# Patient Record
(demographics unavailable — no encounter records)

---

## 2017-05-15 NOTE — ULTRASOUND REPORT
PELVIC ULTRASOUND: 05/15/2017

 

CLINICAL INDICATION: Left lower quadrant pain, irregular menses, ovarian cancer. 

 

COMPARISON: 10/07/2015. 

 

TECHNIQUE: Transabdominal pelvic ultrasound performed for global evaluation. Transvaginal pelvic ultr
asound performed for detailed evaluation. Real-time scanning performed and static images obtained.

 

FINDINGS:  The uterus is anteverted, measuring 7.7 x 4.7 x 3.5 cm. The endometrial echo complex measu
res 3 mm. No focal myometrial lesion is present. The right ovary is surgically absent. The left ovary
 measures 3.6 x 2.1 x 1.2 cm. Free fluid is seen adjacent to the left ovary. 

 

IMPRESSION:  FREE FLUID, LIKELY REPRESENTING A RECENTLY RUPTURED CYST. NORMAL APPEARANCE OF THE LEFT 
OVARY AND UTERUS. CHANGES OF RIGHT OOPHORECTOMY.

 

 

 

DD:05/15/2017 12:07:58  DT: 05/15/2017 12:32  JOB #: [id number]  EXT JOB #:[id number]

## 2017-05-15 NOTE — ED PHYSICIAN DOCUMENTATION
History of Present Illness





- Stated complaint


Stated Complaint: LEFT SIDE PX





- Chief complaint


Chief Complaint: Abd Pain





- Additonal information


Additional information: 





hx from pt


29 female


s/p ovarian cancer and right oopherectomy


abrupt onset LLQ pain last night


still hurts today


rad to flank


chills no fever


some urinary sx - cannot empty


vag bleeding for a month, was heavy now mild / spotting


doubts preg


some diarrhea


no blood in urine or marya








Review of Systems


Constitutional: reports: Chills.  denies: Fever


Cardiac: denies: Chest pain / pressure, Palpitations


Respiratory: denies: Dyspnea, Cough


GI: reports: Abdominal Pain, Diarrhea.  denies: Bloody / black stool


: reports: Hesitancy, Vaginal bleeding.  denies: Now pregnant EGA (doubts)


Musculoskeletal: reports: Back pain


Endocrine: denies: Easy bruising / bleeding


Immunocompromised: denies: Immunocompromised





PD PAST MEDICAL HISTORY





- Past Medical History


Cardiovascular: None


Endocrine/Autoimmune: None


GI: None


GYN: Ovarian cysts


: None


HEENT: Chronic hearing loss


Psych: Depression, Anxiety


Musculoskeletal: Other


Derm: None





- Past Surgical History


Past Surgical History: Yes


General: Appendectomy


/GYN:  section, Oophrectomy





- Present Medications


Home Medications: 


 Ambulatory Orders











 Medication  Instructions  Recorded  Confirmed


 


HYDROcod/ACETAM 5/325 [Norco 5/325] 1 ea PO Q6H PRN #5 tablet 05/15/17 


 


Ibuprofen [Motrin] 400 mg PO Q6H PRN #30 tablet 05/15/17 














- Allergies


Allergies/Adverse Reactions: 


 Allergies











Allergy/AdvReac Type Severity Reaction Status Date / Time


 


adhesive Allergy  Rash Verified 05/15/17 10:02


 


Penicillins Allergy  Hives Verified 05/15/17 10:02


 


vancomycin Allergy  Rash Verified 05/15/17 10:02


 


latex AdvReac  Rash Verified 05/15/17 10:02














- Social History


Does the pt smoke?: Yes


Smoking Status: Current every day smoker


Does the pt drink ETOH?: No


Does the pt have substance abuse?: No





- Immunizations


Immunizations are current?: Yes





- POLST


Patient has POLST: No





PD ED PE NORMAL





- Vitals


Vital signs reviewed: Yes





- Cardiac


Cardiac: RRR





- Respiratory


Respiratory: No respiratory distress





- Abdomen


Abdomen: Soft, Other (TTP LLQ, mild guarding, no rebound, surgical low midline 

scar, minimal RLQ pain)





- Back


Back: No CVA TTP





- Derm


Derm: Normal color





- Neuro


Neuro: Alert and oriented X 3





Results





- Vitals


Vitals: 


 Vital Signs - 24 hr











  05/15/17 05/15/17





  09:58 12:18


 


Temperature 36.6 C 


 


Heart Rate 65 72


 


Respiratory 14 12





Rate  


 


Blood Pressure 128/88 H 111/69


 


O2 Saturation 100 100








 Oxygen











O2 Source                      Room air

















- Labs


Labs: 


 Laboratory Tests











  05/15/17





  10:15


 


Urine Color  YELLOW


 


Urine Clarity  CLEAR


 


Urine pH  6.5


 


Ur Specific Gravity  1.010


 


Urine Protein  NEGATIVE


 


Urine Glucose (UA)  NEGATIVE


 


Urine Ketones  NEGATIVE


 


Urine Occult Blood  NEGATIVE


 


Urine Nitrite  NEGATIVE


 


Urine Bilirubin  NEGATIVE


 


Urine Urobilinogen  0.2 (NORMAL)


 


Ur Leukocyte Esterase  NEGATIVE


 


Ur Microscopic Review  NOT INDICATED


 


Urine Culture Comments  NOT INDICATED


 


Urine HCG, Qual  NEGATIVE














- Rads (name of study)


  ** pelvic sono


Radiology: See rad report (FF next to L ovary otherwise nl)





Departure





- Departure


Disposition: 01 Home, Self Care


Clinical Impression: 


 Ruptured ovarian cyst


Condition: Good


Instructions:  ED Cyst Ovarian


Follow-Up: 


Jeffrey Mercer MD [Primary Care Provider] - 


Prescriptions: 


Ibuprofen [Motrin] 400 mg PO Q6H PRN #30 tablet


 PRN Reason: Pain


HYDROcod/ACETAM 5/325 [Norco 5/325] 1 ea PO Q6H PRN #5 tablet


 PRN Reason: Severe Pain


Comments: 


The urine test was fine


You are not pregnant


The ultrasound shows fluid around the left ovary consistent with a ruptured cyst


Otherwise the ovary looks fine and has normal blood flow


The pain should gradually subside as the fluid reabsorbs over the next few days 

- you should not need vicodin for more than one day.


Follow up with your GYN this week


Return to the ER sooner if worse or new symptoms develop - occasionally fluid 

or blood continues to leak and surgery is needed
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
## 2017-12-15 NOTE — ED PHYSICIAN DOCUMENTATION
PD HPI FEMALE 





- Stated complaint


Stated Complaint: FEMALE 





- Chief complaint


Chief Complaint: Abd Pain





- History obtained from


History obtained from: Patient





- History of Present Illness


Timing - onset: Yesterday


Timing - duration: Days (1)


Timing - details: Gradual onset, Still present


Associated symptoms: Pelvic pain, Vaginal bleeding


OB-GYN History: Oopeherctomy, Ovarian cancer


Similar symptoms before: Has not had sx before


Recently seen: Not recently seen





- Additional information


Additional information: 





29-year-old female who is a survivor of ovarian cancer has had her right ovary 

removed and she has been in remission since .  She has had a heavy heavy 

menstrual period 3 weeks ago she had one week with no bleeding and then 

yesterday morning began to bleed heavily again.  She went through 20 pads and a 

24 hour period. She has had some cramping pelvic pain as well similar to what 

she has had with heavy periods previously.  She does not have a known history 

of fibroids.  She does get regular follow-up imaging done.She was at work this 

morning bleeding heavily and her boss was concerned about the possibility of 

pregnancy and miscarriage and sent her to the hospital for evaluation.





Review of Systems


Constitutional: denies: Fever, Myalgias


Eyes: denies: Decreased vision


Ears: denies: Ear pain


Nose: denies: Congestion


Throat: denies: Sore throat


Cardiac: denies: Chest pain / pressure


Respiratory: denies: Dyspnea, Cough


GI: denies: Abdominal Pain, Nausea, Vomiting


: reports: Vaginal bleeding, Irregular menses.  denies: Dysuria, Frequency


Skin: denies: Rash


Musculoskeletal: reports: Back pain.  denies: Neck pain, Extremity pain





PD PAST MEDICAL HISTORY





- Past Medical History


Cardiovascular: None


Respiratory: None


Neuro: None


Endocrine/Autoimmune: None


GI: None


GYN: Ovarian cysts


: None


HEENT: Chronic hearing loss


Psych: Depression, Anxiety


Musculoskeletal: Other


Derm: None





- Past Surgical History


Past Surgical History: Yes


General: Appendectomy


/GYN:  section, Oophrectomy





- Present Medications


Home Medications: 


 Ambulatory Orders











 Medication  Instructions  Recorded  Confirmed


 


No Known Home Medications [No  12/15/17 12/15/17





Known Home Medications]   














- Allergies


Allergies/Adverse Reactions: 


 Allergies











Allergy/AdvReac Type Severity Reaction Status Date / Time


 


adhesive Allergy  Rash Verified 17 21:47


 


Penicillins Allergy  Hives Verified 17 21:47


 


vancomycin Allergy  Rash Verified 17 21:47


 


latex AdvReac  Rash Verified 17 21:47














- Social History


Does the pt smoke?: Yes


Smoking Status: Current every day smoker


Does the pt drink ETOH?: No


Does the pt have substance abuse?: No





- Immunizations


Immunizations are current?: Yes





- POLST


Patient has POLST: No





PD ED PE NORMAL





- Vitals


Vital signs reviewed: Yes (Tachycardic)





- General


General: Alert and oriented X 3, Well developed/nourished, Other (The patient 

appears emotional on arrival.)





- HEENT


HEENT: Atraumatic, PERRL, EOMI





- Neck


Neck: Supple, no meningeal sign, No bony TTP





- Cardiac


Cardiac: RRR, No murmur





- Respiratory


Respiratory: No respiratory distress, Clear bilaterally





- Abdomen


Abdomen: Soft, Other (Mild suprapubic tenderness without guarding or rebound)





- Back


Back: No CVA TTP, No spinal TTP





- Derm


Derm: Normal color, Warm and dry, No rash





- Extremities


Extremities: No deformity, No edema





- Neuro


Neuro: No motor deficit, No sensory deficit


Eye Opening: Spontaneous


Motor: Obeys Commands


Verbal: Oriented


GCS Score: 15





- Psych


Psych: Normal mood, Normal affect





Results





- Vitals


Vitals: 


 Vital Signs - 24 hr











  12/15/17





  09:29


 


Temperature 36.5 C


 


Heart Rate 106 H


 


Respiratory 16





Rate 


 


Blood Pressure 123/79


 


O2 Saturation 100








 Oxygen











O2 Source                      Room air

















- Labs


Labs: 


 Laboratory Tests











  12/15/17 12/15/17





  09:32 09:32


 


Urine Color   YELLOW


 


Urine Clarity   CLEAR


 


Urine pH   5.5


 


Ur Specific Gravity  >=1.030 H  >=1.030 H


 


Urine Protein   NEGATIVE


 


Urine Glucose (UA)   NEGATIVE


 


Urine Ketones   NEGATIVE


 


Urine Occult Blood   TRACE-INTA


 


Urine Nitrite   NEGATIVE


 


Urine Bilirubin   NEGATIVE


 


Urine Urobilinogen   0.2 (NORMAL)


 


Ur Leukocyte Esterase   NEGATIVE


 


Ur Microscopic Review   NOT INDICATED


 


Urine Culture Comments   NOT INDICATED


 


Urine HCG, Qual  NEGATIVE 














PD MEDICAL DECISION MAKING





- ED course


Complexity details: reviewed old records, reviewed results, re-evaluated patient

, considered differential, d/w patient


ED course: 





29-year-old female with dysfunctional uterine bleeding has a regular follow-up 

with GYN with Dr. Agudelo in [place].  I have asked her to call Dr. Dailey 

consider starting birth control today to lessen the bleeding and follow-up with 

him.  She is not pregnant today.





Departure





- Departure


Disposition: 01 Home, Self Care


Clinical Impression: 


 Dysfunctional uterine bleeding





Condition: Good


Instructions:  ED Bleed Irregular Vaginal


Follow-Up: 


Jeffrey Mercer MD [Primary Care Provider] - 


Comments: 


Follow-up with Dr. Cuba this afternoon by telephone and asked about starting some 

birth control for decreasing the bleeding.


Forms:  Activity restrictions

## 2018-03-31 NOTE — ED PHYSICIAN DOCUMENTATION
PD HPI BACK PAIN





- Stated complaint


Stated Complaint: TAILBONE PAIN





- Chief complaint


Chief Complaint: Ext Problem





- History obtained from


History obtained from: Patient





- History of Present Illness


Timing - onset: Today


Timing - details: Abrupt onset


Pain level now: 8


Location: Lower, Right, Left


Quality: Pain


Associated symptoms: No: Fever, Weakness, Numbness, Incontinent of urine, 

Unable to urinate, Hematuria, Incontinent of stool


Improves with: Rest


Worsened by: Movement


Similar symptoms before: Work up / diagnostics ( ED performed xrays last week)


Recently seen: Emergency Dept





- Additional information


Additional information: 





15th [facility] ED visit in past 3 years. c/o low back pain, describes recurring 

injury to low back due to falls over past few weeks and again tonight. seen in 

 ED last week for LBP after falling. she says US was performed and they 

recommended CT, but she says she could not arrange for CT because her PMD is a 

pediatrician (per patient). she subsequently clarifies that she had xrays and 

not US 





Review of Systems


: denies: Unable to Void, Incontinent


Musculoskeletal: reports: Back pain.  denies: Neck pain


Neurologic: denies: Focal weakness, Numbness





PD PAST MEDICAL HISTORY





- Past Medical History


Cardiovascular: None


Respiratory: None


Neuro: None


Endocrine/Autoimmune: None


GI: None


GYN: Ovarian cysts, Ovarian cancer


: None


HEENT: Chronic hearing loss


Psych: Depression, Anxiety


Musculoskeletal: Other


Derm: None


Other Past Medical History: pelvic adhesions





- Past Surgical History


Past Surgical History: Yes


General: Appendectomy


/GYN:  section, Oophrectomy





- Present Medications


Home Medications: 


 Ambulatory Orders











 Medication  Instructions  Recorded  Confirmed


 


HYDROcod/ACETAM 5/325 [Norco 5/325] 1 - 2 ea PO Q6H PRN #15 tablet 18 














- Allergies


Allergies/Adverse Reactions: 


 Allergies











Allergy/AdvReac Type Severity Reaction Status Date / Time


 


adhesive Allergy  Rash Verified 18 03:23


 


Penicillins Allergy  Hives Verified 18 03:23


 


vancomycin Allergy  Rash Verified 18 03:23


 


latex AdvReac  Rash Verified 18 03:23














- Social History


Does the pt smoke?: Yes


Smoking Status: Current every day smoker


Does the pt drink ETOH?: Yes


Does the pt have substance abuse?: No





- Immunizations


Immunizations are current?: Yes





- POLST


Patient has POLST: No





PD ED PE NORMAL





- Vitals


Vital signs reviewed: Yes





- General


General: Alert and oriented X 3, No acute distress, Well developed/nourished





- HEENT


HEENT: Atraumatic





- Back


Back: No CVA TTP, Other (mild bilateral paralumbar tenderness without bruising, 

swelling, or bony step-off)





- Extremities


Extremities: Normal ROM s pain





- Neuro


Neuro: Alert and oriented X 3, No motor deficit, No sensory deficit





Results





- Vitals


Vitals: 


 Oxygen











O2 Source                      Room air

















PD MEDICAL DECISION MAKING





- ED course


Complexity details: reviewed old records (reviewed records from  ED via [facility])

, considered differential, d/w patient





Departure





- Departure


Disposition:  Home, Self Care


Clinical Impression: 


Injury of back


Qualifiers:


 Encounter type: initial encounter Qualified Code(s): S39.92XA - Unspecified 

injury of lower back, initial encounter





Condition: Good


Instructions:  ED Low Back Pain Injury


Follow-Up: 


[facility] [Provider Group]


[facility] [Provider Group]


Jeffrey Mercer MD [Primary Care Provider] - 


Prescriptions: 


HYDROcod/ACETAM 5/325 [Norco 5/325] 1 - 2 ea PO Q6H PRN #15 tablet


 PRN Reason: Pain


Forms:  Activity restrictions


Discharge Date/Time: 18 08:08

## 2018-05-19 NOTE — ED PHYSICIAN DOCUMENTATION
PD HPI BACK PAIN





- Stated complaint


Stated Complaint: BACK PX





- Chief complaint


Chief Complaint: Back Pain





- History obtained from


History obtained from: Patient





- History of Present Illness


Timing - onset: How many days ago (5)


Timing - details: Gradual onset


Pain level max: 9


Pain level now: 8


Location: Lower, Right, Left


Quality: Pain, Spasm, Similar to prior episodes


Associated symptoms: No: Fever, Weakness, Numbness, Incontinent of urine, 

Unable to urinate, Hematuria, Incontinent of stool


Improves with: Rest


Worsened by: Movement


Contributing factors: Other (States started after gardening).  No: 

Anticoagulated, Cancer, IVDA


Similar symptoms before: Diagnosis (Has had chronic low back pain)


Recently seen: Not recently seen





Review of Systems


Constitutional: denies: Fever, Chills


Ears: denies: Ear pain


Nose: denies: Rhinorrhea / runny nose, Congestion


Cardiac: denies: Chest pain / pressure


Respiratory: denies: Cough


GI: denies: Abdominal Pain, Nausea, Vomiting, Diarrhea


: denies: Frequency, Hesitancy, Hematuria


Skin: denies: Rash


Musculoskeletal: denies: Neck pain


Neurologic: denies: Focal weakness, Numbness





PD PAST MEDICAL HISTORY





- Past Medical History


Past Medical History: Yes


Cardiovascular: None


Respiratory: None


Endocrine/Autoimmune: None


GI: None


GYN: Ovarian cysts, Ovarian cancer


: None


HEENT: Chronic hearing loss


Psych: Depression, Anxiety


Musculoskeletal: Other


Derm: None





- Past Surgical History


Past Surgical History: Yes


General: Appendectomy


/GYN:  section, Oophrectomy





- Present Medications


Home Medications: 


 Ambulatory Orders











 Medication  Instructions  Recorded  Confirmed


 


HYDROcod/ACETAM 5/325 [Norco 5/325] 1 - 2 ea PO Q6H PRN #15 tablet 18 


 


Cyclobenzaprine [Flexeril] 10 mg PO TID PRN #20 tablet 18 


 


Hydrocodone/Acetaminophen 1 - 2 each PO Q6H PRN #14 tablet 18 





[Hydrocodon-Acetaminophen 5-325]   














- Allergies


Allergies/Adverse Reactions: 


 Allergies











Allergy/AdvReac Type Severity Reaction Status Date / Time


 


adhesive Allergy  Rash Verified 18 10:23


 


Penicillins Allergy  Hives Verified 18 10:23


 


vancomycin Allergy  Rash Verified 18 10:23


 


latex AdvReac  Rash Verified 18 10:23














- Social History


Does the pt smoke?: Yes


Smoking Status: Current every day smoker


Does the pt drink ETOH?: Yes


Does the pt have substance abuse?: No





- Immunizations


Immunizations are current?: Yes





- POLST


Patient has POLST: No





PD ED PE NORMAL





- Vitals


Vital signs reviewed: Yes





- General


General: Alert and oriented X 3, Other (appears uncomfortable)





- HEENT


HEENT: Moist mucous membranes





- Neck


Neck: Supple, no meningeal sign





- Cardiac


Cardiac: RRR





- Respiratory


Respiratory: No respiratory distress, Clear bilaterally





- Abdomen


Abdomen: Soft, Non tender, Non distended





- Back


Back: No spinal TTP (no midline tenderness to palpation or percussion), Other (

Paraspinal spasm present bilateral low lumbar)





- Derm


Derm: Warm and dry





- Extremities


Extremities: Other (normal bilateral lower extremity patellar and ankle jerk 

reflexes. Normal great toe extension bilaterally)





- Neuro


Neuro: Alert and oriented X 3, No motor deficit, No sensory deficit





- Psych


Psych: Normal mood, Normal affect





Results





- Vitals


Vitals: 


 Vital Signs - 24 hr











  18





  10:21 11:39


 


Temperature 36.2 C L 36.8 C


 


Heart Rate 88 62


 


Respiratory 16 16





Rate  


 


Blood Pressure 124/93 H 103/68


 


O2 Saturation 100 99








 Oxygen











O2 Source                      Room air

















- Labs


Labs: 


 Laboratory Tests











  18





  10:23


 


Urine Color  YELLOW


 


Urine Clarity  CLEAR


 


Urine pH  7.0


 


Ur Specific Gravity  1.020


 


Urine Protein  NEGATIVE


 


Urine Glucose (UA)  NEGATIVE


 


Urine Ketones  NEGATIVE


 


Urine Occult Blood  NEGATIVE


 


Urine Nitrite  NEGATIVE


 


Urine Bilirubin  NEGATIVE


 


Urine Urobilinogen  0.2 (NORMAL)


 


Ur Leukocyte Esterase  NEGATIVE


 


Ur Microscopic Review  NOT INDICATED


 


Urine Culture Comments  NOT INDICATED


 


Urine HCG, Qual  NEGATIVE














PD MEDICAL DECISION MAKING





- ED course


Complexity details: re-evaluated patient, considered differential (no cauda 

equina, no spinal epidural abscess, no fracture, no aortic dissection or 

evidence of aneursym rupture), d/w patient


ED course: 





Patient is a 30-year-old female who presents to the emergency department with 

her normal exacerbation of her chronic back pain.  States that the only thing 

that normally helps as hydrocodone or Percocet.  Will trial her on hydrocodone 

for home as well as Flexeril.  She feels better after medication here.  No 

evidence of cauda equina, epidural abscess, fracture.  Normal neurological exam 

and gait.  Patient counseled regarding signs and symptoms for which I believe 

and urgent re-evaluation would be necessary. Patient with good understanding of 

and agreement to plan and is comfortable going home at this time





This document was made in part using voice recognition software. While efforts 

are made to proofread this document, sound alike and grammatical errors may 

occur.





Departure





- Departure


Disposition: 01 Home, Self Care


Clinical Impression: 


Back pain


Qualifiers:


 Back pain location: low back pain Chronicity: acute Back pain laterality: 

bilateral Sciatica presence: without sciatica Qualified Code(s): M54.5 - Low 

back pain





Back strain


Qualifiers:


 Encounter type: initial encounter Qualified Code(s): S39.012A - Strain of 

muscle, fascia and tendon of lower back, initial encounter





Condition: Poor


Instructions:  ED Spasm Back No Trauma


Follow-Up: 


Jeffrey Mercer MD [Primary Care Provider] - Within 1 week


Prescriptions: 


Cyclobenzaprine [Flexeril] 10 mg PO TID PRN #20 tablet


 PRN Reason: Spasms


Hydrocodone/Acetaminophen [Hydrocodon-Acetaminophen 5-325] 1 - 2 each PO Q6H 

PRN #14 tablet


 PRN Reason: pain


Comments: 


Return if you worsen. This should improve over the next few days.





Do not drink alcohol or drive while on narcotic pain medicine. 


Note that many narcotic pain relievers also contain tylenol/acetaminophen. 

Please ensure that your total dose of acetaminophen from all sources does not 

exceed 3 grams (3000mg) per day. 


You may constipated on this medication, take a stool softener such as "Colace" 

twice a day while you are on it. Also recommend a over-the-counter laxative 

such as senna or MiraLAX any day that you do not have a bowel movement. 


If you received narcotic pain medication in the emergency department, do not 

drive or operate machinery for the next 24 hours.





Discharge Date/Time: 18 11:43

## 2018-06-16 NOTE — ED PHYSICIAN DOCUMENTATION
PD HPI NVD





- Stated complaint


Stated Complaint: N/V





- Chief complaint


Chief Complaint: Abd Pain





- History obtained from


History obtained from: Patient





- History of Present Illness


Timing - onset: How many hours ago (3)


Timing - duration: Hours (3)


Timing - details: Abrupt onset


Pain level now: 3


Associated symptoms: Abdominal pain.  No: Fever


Improved by: Meds (improved with IV zofran en route given by medics)


Worsened by: Other


Similar symptoms before: No diagnosis


Recently seen: Clinic, Emergency Dept





- Additonal information


Additional information: 





Patient presents via ambulance for three hours of nausea and vomiting with 

abdominal discomfort. She says she has nausea every morning when she wakes up 

which typically resolves after she vomits once, but this morning it persisted 

and that she called 911. this is her fourth emergency department visit in the 

past four weeks which includes visit to [facility] as well as [facility] 

emergency department. She was evaluated in the outpatient setting at [facility] earlier this week to further evaluate a left pelvic mass, had an 

ultrasound, and is waiting to hear back from the gynecologist regarding follow 

up.





Review of Systems


Constitutional: reports: Reviewed and negative


Cardiac: reports: Reviewed and negative


Respiratory: reports: Reviewed and negative


GI: reports: Abdominal Pain, Nausea, Vomiting





PD PAST MEDICAL HISTORY





- Past Medical History


Past Medical History: Yes


Cardiovascular: None


Respiratory: None


Endocrine/Autoimmune: None


GI: None


GYN: Ovarian cysts, Ovarian cancer


: None


HEENT: Chronic hearing loss


Psych: Depression, Anxiety


Musculoskeletal: Other


Derm: None





- Past Surgical History


Past Surgical History: Yes


General: Appendectomy


/GYN:  section, Oophrectomy





- Present Medications


Home Medications: 


 Ambulatory Orders











 Medication  Instructions  Recorded  Confirmed


 


Citalopram [CeleXA] 10 mg PO DAILY 18


 


Ondansetron Odt [Zofran] 4 mg TL Q6H PRN #20 tablet 18 














- Allergies


Allergies/Adverse Reactions: 


 Allergies











Allergy/AdvReac Type Severity Reaction Status Date / Time


 


adhesive Allergy  Rash Verified 18 05:02


 


Penicillins Allergy  Hives Verified 18 05:02


 


vancomycin Allergy  Rash Verified 18 05:02


 


latex AdvReac  Rash Verified 18 05:02














- Social History


Does the pt smoke?: Yes


Smoking Status: Current every day smoker


Does the pt drink ETOH?: Yes


Does the pt have substance abuse?: No


Substance Use and Type: Marijuana





- Immunizations


Immunizations are current?: Yes





- POLST


Patient has POLST: No





PD ED PE NORMAL





- Vitals


Vital signs reviewed: Yes





- General


General: Alert and oriented X 3, No acute distress, Well developed/nourished





- HEENT


HEENT: Moist mucous membranes





- Cardiac


Cardiac: RRR, No murmur





- Respiratory


Respiratory: No respiratory distress, Clear bilaterally





- Abdomen


Abdomen: Soft, Non tender, Non distended





- Back


Back: No CVA TTP





- Derm


Derm: Normal color, Warm and dry





Results





- Vitals


Vitals: 


 Vital Signs - 24 hr











  18





  04:58 06:11 06:43


 


Temperature 36.1 C L  36.7 C


 


Heart Rate 66 55 L 58 L


 


Respiratory 18 18 18





Rate   


 


Blood Pressure 117/75 116/57 L 104/49 L


 


O2 Saturation 100 99 99








 Oxygen











O2 Source                      Room air

















PD MEDICAL DECISION MAKING





- ED course


Complexity details: reviewed old records, reviewed results, re-evaluated patient

, considered differential, d/w patient


ED course: 





results of this weeks ultrasound performed at [facility] were faxed and 

reviewed by me. Recent ED records also reviewed including visit to [facility] ED as well as the previous [facility] ED visit. blood work performed at 

the beginning of this month at [facility] ED reviewed and results were 

unremarkable. Her symptoms this morning were improved with Zofran on rude and 

resolved shortly after arriving to this emergency department, and thus no blood 

work was performed. She was given 1 L of normal saline intravenously, and on 

revaluation reported her symptoms have not returned.





- Sepsis Event


Vital Signs: 


 Vital Signs - 24 hr











  18





  04:58 06:11 06:43


 


Temperature 36.1 C L  36.7 C


 


Heart Rate 66 55 L 58 L


 


Respiratory 18 18 18





Rate   


 


Blood Pressure 117/75 116/57 L 104/49 L


 


O2 Saturation 100 99 99








 Oxygen











O2 Source                      Room air

















Departure





- Departure


Disposition:  Home, Self Care


Clinical Impression: 


 Vomiting





Condition: Good


Instructions:  ED Nausea Vomiting


Prescriptions: 


Ondansetron Odt [Zofran] 4 mg TL Q6H PRN #20 tablet


 PRN Reason: Nausea / Vomiting


Comments: 


Follow up with your doctor; call Monday to arrange for next available 

appointment


Discharge Date/Time: 18 06:44

## 2018-06-30 NOTE — ED PHYSICIAN DOCUMENTATION
PD HPI ABD PAIN





- Stated complaint


Stated Complaint: PX/BLEEDING





- Chief complaint


Chief Complaint: Abd Pain





- History obtained from


History obtained from: Patient





- History of Present Illness


Timing - onset: How many years ago (3), Chronic


Location: Suprapubic


Recently seen: Emergency Dept





- Additional information


Additional information: 





Patient is a 30 year old female with a 3 year history of lower abdominal pain.  

patient was being worked up in the emergency department but was not receiving 

iv narcotics so she took out her IV and refused imaging.  patient checked in 

about 30 minutes later and stated that she would stay of imaging.  





Review of Systems


Ten Systems: 10 systems reviewed and negative


GI: reports: Abdominal Pain, Nausea.  denies: Vomiting





PD PAST MEDICAL HISTORY





- Past Medical History


Past Medical History: Yes


Cardiovascular: None


Respiratory: None


Endocrine/Autoimmune: None


GI: None


GYN: Ovarian cysts, Ovarian cancer


: None


HEENT: Chronic hearing loss


Psych: Depression, Anxiety


Musculoskeletal: Other


Derm: None





- Past Surgical History


Past Surgical History: Yes


General: Appendectomy


/GYN:  section, Oophrectomy





- Present Medications


Home Medications: 


 Ambulatory Orders











 Medication  Instructions  Recorded  Confirmed


 


Citalopram [CeleXA] 10 mg PO DAILY 18


 


Ondansetron Odt [Zofran] 4 mg TL Q6H PRN #20 tablet 18 


 


Doxycycline Hyclate 100 mg PO BID #28 capsule 18 














- Allergies


Allergies/Adverse Reactions: 


 Allergies











Allergy/AdvReac Type Severity Reaction Status Date / Time


 


adhesive Allergy  Rash Verified 18 22:44


 


Penicillins Allergy  Hives Verified 18 22:44


 


vancomycin Allergy  Rash Verified 18 22:44


 


latex AdvReac  Rash Verified 18 22:44














- Social History


Does the pt smoke?: Yes


Smoking Status: Current every day smoker


Does the pt drink ETOH?: Yes


Does the pt have substance abuse?: No





- Immunizations


Immunizations are current?: Yes





- POLST


Patient has POLST: No





PD ED PE NORMAL





- Vitals


Vital signs reviewed: Yes





- General


General: Alert and oriented X 3





- HEENT


HEENT: Atraumatic





- Cardiac


Cardiac: RRR





- Respiratory


Respiratory: No respiratory distress





- Abdomen


Abdomen: Soft





- Derm


Derm: Normal color, Warm and dry





- Extremities


Extremities: No deformity





- Neuro


Neuro: Alert and oriented X 3





PD ED PE EXPANDED





- Abdomen


Abdomen: Tender to palpation, LLQ.  No: Rebound, Guarding





Results





- Vitals


Vitals: 


 Vital Signs - 24 hr











  18





  22:37


 


Temperature 36.9 C


 


Heart Rate 101 H


 


Respiratory 16





Rate 


 


Blood Pressure 132/91 H


 


O2 Saturation 100








 Oxygen











O2 Source                      Room air

















- Rads (name of study)


  ** ct abd and pelvis


Radiology: Final report received, See rad report (multiple findings see 

radiology report)





PD MEDICAL DECISION MAKING





- ED course


Complexity details: reviewed old records, reviewed results, re-evaluated patient

, considered differential, d/w patient


ED course: 





Patient was seen and examined at bedside.  IV access was gained and imaging was 

re-ordered.  when patient returned from imaging the results were reviewed. 

Patient had multiple pelvic findings.  the findings were reviewed with the 

patient as well as the necessary follow up care.  Patient was started on 

doxycycline for any possible infectious component.  copies of the images and 

labs were made for the patient.  Patient was made aware that motrin and tylenol 

were the indicated medications and patient took out her IV and walked out.  





- Sepsis Event


Vital Signs: 


 Vital Signs - 24 hr











  18





  22:37


 


Temperature 36.9 C


 


Heart Rate 101 H


 


Respiratory 16





Rate 


 


Blood Pressure 132/91 H


 


O2 Saturation 100








 Oxygen











O2 Source                      Room air

















Departure





- Departure


Disposition: 01 Home, Self Care


Clinical Impression: 


 Hydrosalpinx





Condition: Good


Instructions:  Cysts Ovarian


Follow-Up: 


primary,care provider [Other]


Prescriptions: 


Doxycycline Hyclate 100 mg PO BID #28 capsule


Comments: 


You had multiple findings on your imaging today.  You are being started on 

antibiotics as sometimes infection can be playing a role.  Ultimately you will 

need to follow up with your ob/gyn physician for more definitive care.  A copy 

of your imaging has been provided for you.  You can take motrin or tylenol as 

needed for pain.  You may return to the emergency department at any time for new

, worsening or uncontrollable symptoms.

## 2018-06-30 NOTE — CT REPORT
Procedure Date:  06/30/2018   

Accession Number:  [id number] / [id number]                    

Procedure:  CT  - Abdomen/Pelvis W/ CPT Code:  

 

FULL RESULT:

 

 

EXAM:

CT ABDOMEN AND PELVIS

 

EXAM DATE: 6/30/2018 11:12 PM.

 

CLINICAL HISTORY: Left lower quadrant pain

 

COMPARISONS: ABDOMEN/PELVIS W/ 10/05/2015.

 

TECHNIQUE: Routine helical CT imaging was performed through the abdomen 

and pelvis. IV contrast: ISOVUE 300  100mL. Enteric contrast: No. 

Reconstructions: Coronal and sagittal.

 

In accordance with CT protocol optimization, one or more of the following 

dose reduction techniques were utilized for this exam: automated exposure 

control, adjustment of mA and/or KV based on patient size, or use of 

iterative reconstructive technique.

 

FINDINGS:

Lung Bases: Unremarkable.

 

Liver: There is hypodensity likely representative of fatty sparing within 

the anterior aspect of the left hepatic lobe.

 

Gallbladder/Bile Ducts: Unremarkable.

 

Spleen: Normal.

 

Pancreas: Normal.

 

Adrenal Glands: Normal.

 

Kidneys: Normal. No masses or hydronephrosis.

 

Peritoneal Cavity/Bowel: No dilated or thick-walled bowel is seen. There 

is moderate stool within colon. No enlarged mesenteric or retroperitoneal 

lymph nodes. No intraperitoneal free air or free fluid.

 

Pelvic Organs: There is a low-density focus measuring approximately 15 x 

5 cm within the left adnexa.

 

Vasculature: No aneurysms or other significant abnormality.

 

Bones: No significant abnormality.

 

Other: None.

IMPRESSION:

1. Circumscribed fluid density focus within the right adnexa is 

suspicious for hydrosalpinx. Differential consideration would be an 

enlarged ovarian cyst or cystic ovarian neoplasm.

2. No acute gastrointestinal tract abnormalities are seen.

3. Solid abdominal organs demonstrate no acute abnormalities.

 

[facility]

## 2018-06-30 NOTE — ED PHYSICIAN DOCUMENTATION
PD HPI ABD PAIN





- Stated complaint


Stated Complaint: ABDOMINAL PX/FAINTING





- Chief complaint


Chief Complaint: Abd Pain





- History obtained from


History obtained from: Patient





- History of Present Illness


Timing - onset: How many years ago (3), Chronic


Timing - details: Intermittant, Waxing and waning


Quality: Cramping, Aching


Location: LLQ


Associated symptoms: Nausea, Vomiting.  No: Fever


Similar symptoms before: Work up / diagnostics, Treatment


Recently seen: Clinic





- Additional information


Additional information: 





Patient is a 30 year old female presenting to the emergency department for a 

three year history of intermittent left lower quadrant abdominal pain.  Patient 

states that nobody has been able to diagnose her.  Patient sates that she had 

an episode of fainting today and the pain seemed worse so she came to the 

emergency department for evaluation.  





Review of Systems


Constitutional: denies: Fever, Chills


Eyes: reports: Reviewed and negative


Ears: reports: Reviewed and negative


Nose: reports: Reviewed and negative


Cardiac: denies: Chest pain / pressure


Respiratory: denies: Dyspnea


GI: reports: Abdominal Pain.  denies: Abdominal Swelling, Nausea, Vomiting, 

Constipation, Diarrhea


: denies: Dysuria, Frequency


Musculoskeletal: denies: Back pain


Neurologic: reports: Syncope.  denies: Headache, Head injury





PD PAST MEDICAL HISTORY





- Past Medical History


Cardiovascular: None


Respiratory: None


Endocrine/Autoimmune: None


GI: None


GYN: Ovarian cysts, Ovarian cancer


: None


HEENT: Chronic hearing loss


Psych: Depression, Anxiety


Musculoskeletal: Other


Derm: None





- Past Surgical History


Past Surgical History: Yes


General: Appendectomy


/GYN:  section, Oophrectomy





- Present Medications


Home Medications: 


 Ambulatory Orders











 Medication  Instructions  Recorded  Confirmed


 


Citalopram [CeleXA] 10 mg PO DAILY 18


 


Ondansetron Odt [Zofran] 4 mg TL Q6H PRN #20 tablet 18 














- Allergies


Allergies/Adverse Reactions: 


 Allergies











Allergy/AdvReac Type Severity Reaction Status Date / Time


 


adhesive Allergy  Rash Verified 18 22:44


 


Penicillins Allergy  Hives Verified 18 22:44


 


vancomycin Allergy  Rash Verified 18 22:44


 


latex AdvReac  Rash Verified 18 22:44














- Social History


Does the pt smoke?: Yes


Smoking Status: Current every day smoker


Does the pt drink ETOH?: Yes


Does the pt have substance abuse?: No





- Immunizations


Immunizations are current?: Yes





- POLST


Patient has POLST: No





PD ED PE NORMAL





- Vitals


Vital signs reviewed: Yes





- General


General: Alert and oriented X 3, No acute distress





- HEENT


HEENT: Atraumatic





- Cardiac


Cardiac: RRR





- Respiratory


Respiratory: No respiratory distress





- Abdomen


Abdomen: Soft





- Derm


Derm: Normal color, Warm and dry, No rash





- Extremities


Extremities: No deformity





- Neuro


Neuro: Alert and oriented X 3, No motor deficit


Eye Opening: Spontaneous





PD ED PE EXPANDED





- HEENT


HEENT: Dental decay





- Abdomen


Abdomen: Tender to palpation, LLQ.  No: Distended, Rebound, Guarding





Results





- Vitals


Vitals: 


 Vital Signs - 24 hr











  18





  20:04 21:40


 


Temperature 36.3 C L 


 


Heart Rate 101 H 70


 


Respiratory 16 





Rate  


 


Blood Pressure 120/79 101/74


 


O2 Saturation 100 100








 Oxygen











O2 Source                      Room air

















- Labs


Labs: 


 Laboratory Tests











  18





  20:21 20:21 20:59


 


WBC  4.9  


 


RBC  4.45  


 


Hgb  14.2  


 


Hct  42.6  


 


MCV  95.7  


 


MCH  31.8 H  


 


MCHC  33.2  


 


RDW  15.5 H  


 


Plt Count  279  


 


MPV  7.5 L  


 


Neut # (Auto)  2.2  


 


Lymph # (Auto)  2.0  


 


Mono # (Auto)  0.6  


 


Eos # (Auto)  0.1  


 


Baso # (Auto)  0.0  


 


Absolute Nucleated RBC  0.00  


 


Nucleated RBC %  0.1  


 


Sodium   138 


 


Potassium   3.9 


 


Chloride   102 


 


Carbon Dioxide   30 


 


Anion Gap   6.0 


 


BUN   10 


 


Creatinine   0.8 


 


Estimated GFR (MDRD)   84 L 


 


Glucose   75 


 


Calcium   9.3 


 


Total Bilirubin   0.5 


 


AST   20 


 


ALT   19 


 


Alkaline Phosphatase   52 


 


Total Protein   7.0 


 


Albumin   3.8 


 


Globulin   3.2 


 


Albumin/Globulin Ratio   1.2 


 


Lipase   21 L 


 


Urine Color    YELLOW


 


Urine Clarity    CLEAR


 


Urine pH    7.5


 


Ur Specific Gravity    <=1.005


 


Urine Protein    NEGATIVE


 


Urine Glucose (UA)    NEGATIVE


 


Urine Ketones    NEGATIVE


 


Urine Occult Blood    NEGATIVE


 


Urine Nitrite    NEGATIVE


 


Urine Bilirubin    NEGATIVE


 


Urine Urobilinogen    0.2 (NORMAL)


 


Ur Leukocyte Esterase    NEGATIVE


 


Urine RBC    None Seen


 


Urine WBC    0-3


 


Ur Squamous Epith Cells    MANY Squamous H


 


Urine Bacteria    None Seen


 


Urine Culture Comments    NOT INDICATED


 


Urine HCG, Qual    NEGATIVE














PD MEDICAL DECISION MAKING





- ED course


Complexity details: reviewed old records, reviewed results, re-evaluated patient

, considered differential, d/w patient


ED course: 





Patient was seen and examined at bedside.  patient was well appearing and in no 

distress.  labs were drawn and urine was collected.  patient was treated with 

toradol for pain.  Imaging was ordered.  patient stated that she wanted to 

leave before imaging was performed.  It was extremely unlikely that there was 

any emergent condition that needed to be treated.  patient was discharged in 

stable condition but walked out, tearing our her iv, before receiving her 

paperwork.  





- Sepsis Event


Vital Signs: 


 Vital Signs - 24 hr











  18





  20:04 21:40


 


Temperature 36.3 C L 


 


Heart Rate 101 H 70


 


Respiratory 16 





Rate  


 


Blood Pressure 120/79 101/74


 


O2 Saturation 100 100








 Oxygen











O2 Source                      Room air

















Departure





- Departure


Disposition: 01 Home, Self Care


Clinical Impression: 


 Abdominal pain





Condition: Good


Instructions:  Abdominal Pain


Discharge Date/Time: 18 22:21

## 2018-07-14 NOTE — ED PHYSICIAN DOCUMENTATION
PD HPI FEMALE 





- Stated complaint


Stated Complaint: FEMALE 





- Chief complaint


Chief Complaint: Abd Pain





- History obtained from


History obtained from: Patient





- History of Present Illness


Timing - onset: How many days ago (2)


Timing - details: Gradual onset, Still present


Associated symptoms: Hematuria


Similar symptoms before: Work up / diagnostics


Recently seen: Not recently seen





- Additional information


Additional information: 





patient is a 30 year old female with multiple  issues including a large mass 

that is scheduled to be removed.  Patient reports that she had hematuria so she 

called her doctor who told her to come to the emergency department to be seen.  

patient is well appearing and denies any other complaints at this time.  





Review of Systems


Ten Systems: 10 systems reviewed and negative


: reports: Hematuria, Vaginal bleeding





PD PAST MEDICAL HISTORY





- Past Medical History


Past Medical History: Yes


Cardiovascular: None


Respiratory: None


Endocrine/Autoimmune: None


GI: None


GYN: Ovarian cysts, Ovarian cancer


: None


HEENT: Chronic hearing loss


Psych: Depression, Anxiety


Musculoskeletal: Other


Derm: None





- Past Surgical History


Past Surgical History: Yes


General: Appendectomy


/GYN:  section, Oophrectomy





- Present Medications


Home Medications: 


 Ambulatory Orders











 Medication  Instructions  Recorded  Confirmed


 


Ondansetron Odt [Zofran] 4 mg TL Q6H PRN #20 tablet 18


 


Nitrofurantoin Monohyd/M-Cryst 100 mg PO BID 5 Days  capsule 18 





[Macrobid 100 mg Capsule]   














- Allergies


Allergies/Adverse Reactions: 


 Allergies











Allergy/AdvReac Type Severity Reaction Status Date / Time


 


adhesive Allergy  Rash Verified 18 22:56


 


Penicillins Allergy  Hives Verified 18 22:56


 


vancomycin Allergy  Rash Verified 18 22:56


 


latex AdvReac  Rash Verified 18 22:56














- Social History


Does the pt smoke?: Yes


Smoking Status: Current every day smoker


Does the pt drink ETOH?: Yes


Does the pt have substance abuse?: No





- Immunizations


Immunizations are current?: Yes





- POLST


Patient has POLST: No





PD ED PE NORMAL





- Vitals


Vital signs reviewed: Yes





- General


General: Alert and oriented X 3, No acute distress





- HEENT


HEENT: Atraumatic





- Cardiac


Cardiac: RRR





- Respiratory


Respiratory: No respiratory distress





- Abdomen


Abdomen: Soft, Non tender, Non distended





- Derm


Derm: Normal color, Warm and dry





- Extremities


Extremities: No deformity





- Neuro


Neuro: Alert and oriented X 3


Eye Opening: Spontaneous


Motor: Obeys Commands


Verbal: Oriented


GCS Score: 15





Results





- Vitals


Vitals: 


 Vital Signs - 24 hr











  18





  22:53 23:40


 


Temperature 36.6 C 


 


Heart Rate 102 H 71


 


Respiratory 96 H 20





Rate  


 


Blood Pressure 112/84 H 106/69


 


O2 Saturation  97








 Oxygen











O2 Source                      Room air

















- Labs


Labs: 


 Laboratory Tests











  18





  23:00


 


Urine Color  YELLOW


 


Urine Clarity  CLEAR


 


Urine pH  6.0


 


Ur Specific Gravity  <=1.005


 


Urine Protein  NEGATIVE


 


Urine Glucose (UA)  NEGATIVE


 


Urine Ketones  NEGATIVE


 


Urine Occult Blood  NEGATIVE


 


Urine Nitrite  NEGATIVE


 


Urine Bilirubin  NEGATIVE


 


Urine Urobilinogen  0.2 (NORMAL)


 


Ur Leukocyte Esterase  TRACE H


 


Urine RBC  None Seen


 


Urine WBC  4-5


 


Ur Squamous Epith Cells  RARE Squamous


 


Urine Bacteria  None Seen


 


Ur Microscopic Review  INDICATED


 


Urine Culture Comments  INDICATED


 


Urine HCG, Qual  NEGATIVE














PD MEDICAL DECISION MAKING





- ED course


Complexity details: reviewed old records, reviewed results, re-evaluated patient

, considered differential, d/w patient


ED course: 





Patient was seen and examined at bedside.  patient was well appearing and in no 

distress.  Patient's urine was collected and did have small amount of leuk 

esterase.  Patient's previous urinalysis and cultures were reviewed.  the 

presentation and findings were similar to patient's previous infection in the 

past so patient was started on macrobid.  Patient required no further work up 

at this time and was stable for discharge with outpatient follow up.  





- Sepsis Event


Vital Signs: 


 Vital Signs - 24 hr











  18





  22:53 23:40


 


Temperature 36.6 C 


 


Heart Rate 102 H 71


 


Respiratory 96 H 20





Rate  


 


Blood Pressure 112/84 H 106/69


 


O2 Saturation  97








 Oxygen











O2 Source                      Room air

















Departure





- Departure


Disposition:  Home, Self Care


Clinical Impression: 


 Urinary tract infection





Condition: Good


Instructions:  ED UTI Cystitis Female


Follow-Up: 


Jeffrey Mercer MD [Primary Care Provider] - As Needed


Prescriptions: 


Nitrofurantoin Monohyd/M-Cryst [Macrobid 100 mg Capsule] 100 mg PO BID 5 Days  

capsule


Comments: 


Your urinalysis was similar to 2016 when you had a urinary tract infection.  

You are being started on antibiotics tonight and will be on them for the next 5 

days.  it is important that you stay well hydrated.  You should continue to 

follow up with your doctor for further care.  You may return to the emergency 

department at any time for new, worsening or uncontrollable symptoms.    


Discharge Date/Time: 18 23:42

## 2018-07-29 NOTE — XRAY REPORT
Procedure Date:  07/29/2018   

Accession Number:  [id number] / [id number]                    

Procedure:  XR  - Shoulder 3 View RT CPT Code:  

 

FULL RESULT:

 

 

EXAM:

RIGHT SHOULDER RADIOGRAPHY

 

EXAM DATE: 7/29/2018 01:45 PM.

 

CLINICAL HISTORY: Right shoulder pain.

 

COMPARISON: None.

 

TECHNIQUE: 3 views.

 

FINDINGS:

Bones: Normal. No fracture or bone lesion.

 

Joints: The glenohumeral and acromioclavicular joints are normal.

 

Soft tissues: The visualized hemithorax is unremarkable. No soft tissue 

swelling.

IMPRESSION: Normal shoulder radiography.

 

[facility]

## 2018-07-29 NOTE — ED PHYSICIAN DOCUMENTATION
PD HPI UPPER EXT INJURY





- Stated complaint


Stated Complaint: SHOULDER PX





- Chief complaint


Chief Complaint: Ext Problem





- History obtained from


History obtained from: Patient





- History of Present Illness


Location: Right, Shoulder


Type of injury: Other (Without specific injury she has had a burning pain of 

the entirety of the posterior and top of the right shoulder up to the neck for 

the last 3 days.  Hurts to move the shoulder at all and hurts to look to her 

left.  She describes numbness in that arm as well as weakness.  She has taken 

over-the-counter medications for pain without effect.  She has not had problems 

with that shoulder before.)





Review of Systems


Constitutional: denies: Fever, Chills


Cardiac: denies: Chest pain / pressure, Palpitations


Respiratory: denies: Dyspnea, Cough


: denies: Now pregnant EGA





PD PAST MEDICAL HISTORY





- Past Medical History


Past Medical History: Yes


Cardiovascular: None


Respiratory: None


Endocrine/Autoimmune: None


GI: None


GYN: Ovarian cysts, Ovarian cancer


: None


HEENT: Chronic hearing loss


Psych: Depression, Anxiety


Musculoskeletal: Other


Derm: None





- Past Surgical History


Past Surgical History: Yes


General: Appendectomy


/GYN:  section, Oophrectomy





- Present Medications


Home Medications: 


 Ambulatory Orders











 Medication  Instructions  Recorded  Confirmed


 


Citalopram Hydrobromide  18





[Citalopram HBr]   


 


Cyclobenzaprine [Flexeril] 10 mg PO TID PRN #20 tablet 18 


 


Oxycodone HCl/Acetaminophen 1 - 2 tab PO Q4H PRN #15 tablet 18 





[Percocet 5-325 mg Tablet]   














- Allergies


Allergies/Adverse Reactions: 


 Allergies











Allergy/AdvReac Type Severity Reaction Status Date / Time


 


adhesive Allergy  Rash Verified 18 22:56


 


Penicillins Allergy  Hives Verified 18 22:56


 


vancomycin Allergy  Rash Verified 18 22:56


 


latex AdvReac  Rash Verified 18 13:12














- Social History


Does the pt smoke?: Yes


Smoking Status: Current every day smoker


Does the pt drink ETOH?: Yes


Does the pt have substance abuse?: No





- Immunizations


Immunizations are current?: Yes





- POLST


Patient has POLST: No





PD ED PE NORMAL





- Vitals


Vital signs reviewed: Yes





- General


General: Alert and oriented X 3, No acute distress





- Neck


Neck: Supple, no meningeal sign, No bony TTP





- Extremities


Extremities: Other (She has diffuse muscular tenderness of the right shoulder 

posteriorly and laterally.  She is able to abduct to about 30 and has 

relatively painless internal and external rotation.  She has numbness 

throughout the right arm which is completely in a nondermatomal pattern and 

involves the deltoid, both sides of the biceps, both sides of the forearm, and 

both sides of the hand.  She also has partial weakness throughout that arm 

which is also in a inconsistent/nondermatomal pattern with weak  strength, 

weak thumb extension, weak interosseous strength etc.  She has normal radial 

pulses.)





- Neuro


Neuro: Alert and oriented X 3, Normal speech





Results





- Vitals


Vitals: 


 Vital Signs - 24 hr











  18





  13:09 14:10


 


Temperature 36.4 C L 36.6 C


 


Heart Rate 118 H 74


 


Respiratory 18 16





Rate  


 


Blood Pressure 114/83 H 107/71


 


O2 Saturation 100 98








 Oxygen











O2 Source                      Room air

















- Rads (name of study)


  ** R shoulder 3v


Radiology: EMP read contemporaneously (normal)





PD MEDICAL DECISION MAKING





- ED course


ED course: 





Cervical radiculopathy is considered given the neck pain and weakness, however 

her weakness and numbness are completely in a nondermatomal distribution which 

suggests against radiculopathy and more towards pain related weakness.





- Sepsis Event


Vital Signs: 


 Vital Signs - 24 hr











  18





  13:09 14:10


 


Temperature 36.4 C L 36.6 C


 


Heart Rate 118 H 74


 


Respiratory 18 16





Rate  


 


Blood Pressure 114/83 H 107/71


 


O2 Saturation 100 98








 Oxygen











O2 Source                      Room air

















Departure





- Departure


Disposition: 01 Home, Self Care


Clinical Impression: 


Right shoulder pain


Qualifiers:


 Chronicity: acute Qualified Code(s): M25.511 - Pain in right shoulder





Condition: Good


Record reviewed to determine appropriate education?: Yes


Instructions:  ED Strain Muscle Ext


Follow-Up: 


[facility] [Provider Group]


Prescriptions: 


Cyclobenzaprine [Flexeril] 10 mg PO TID PRN #20 tablet


 PRN Reason: Pain


Oxycodone HCl/Acetaminophen [Percocet 5-325 mg Tablet] 1 - 2 tab PO Q4H PRN #15 

tablet


 PRN Reason: Pain


Comments: 


Call your doctor to arrange a follow-up appointment, make the next available 

appointment.  In the interim, return anytime if worse or if new symptoms 

develop.





Your blood pressure was elevated today on check into the emergency department.  

This does not mean that you have hypertension, it is a common phenomenon to 

come to the emergency department and have elevated blood pressure.  I recommend 

that you see your primary care physician within the week to have it rechecked 

when you are feeling better.


Discharge Date/Time: 18 14:10

## 2018-08-10 NOTE — ED PHYSICIAN DOCUMENTATION
PD HPI ABD PAIN





- Stated complaint


Stated Complaint: N/V, 





- Chief complaint


Chief Complaint: Abd Pain





- History obtained from


History obtained from: Patient





- History of Present Illness


Timing - onset: Today (30-year-old woman has had a lot of proximal pelvic 

problems in the past and she has been bleeding for about a week.  But today she 

started having vomiting and diarrhea in the.  There is really no increase in 

her abdominal pain except for some cramping and fullness.  She denies 

hematemesis.  She does not think she has hematochezia because it is hard to 

tell because of the vaginal bleeding.  No sick contacts or recent travel.)





Review of Systems


Constitutional: reports: Chills.  denies: Fever


Respiratory: denies: Dyspnea, Cough


GI: reports: Abdominal Pain, Nausea, Vomiting, Diarrhea





PD PAST MEDICAL HISTORY





- Past Medical History


Cardiovascular: None


Respiratory: None


Endocrine/Autoimmune: None


GI: None


GYN: Ovarian cysts, Ovarian cancer


: None


HEENT: Chronic hearing loss


Psych: Depression, Anxiety


Musculoskeletal: Other


Derm: None





- Past Surgical History


Past Surgical History: Yes


General: Appendectomy


/GYN:  section, Oophrectomy





- Present Medications


Home Medications: 


 Ambulatory Orders











 Medication  Instructions  Recorded  Confirmed


 


Citalopram Hydrobromide  18





[Citalopram HBr]   


 


Cyclobenzaprine [Flexeril] 10 mg PO TID PRN #20 tablet 18 


 


Oxycodone HCl/Acetaminophen 1 - 2 tab PO Q4H PRN #15 tablet 18 





[Percocet 5-325 mg Tablet]   


 


Loperamide [Imodium] 2 mg PO QID PRN #10 capsule 08/10/18 


 


Ondansetron HCl [Zofran] 4 mg PO Q6H PRN #10 tablet 08/10/18 


 


Promethazine [Phenergan] 25 - 50 mg PO Q6H PRN #15 tab 08/10/18 














- Allergies


Allergies/Adverse Reactions: 


 Allergies











Allergy/AdvReac Type Severity Reaction Status Date / Time


 


adhesive Allergy  Rash Verified 18 22:56


 


Penicillins Allergy  Hives Verified 18 22:56


 


vancomycin Allergy  Rash Verified 18 22:56


 


latex AdvReac  Rash Verified 18 13:12














- Social History


Does the pt smoke?: Yes


Smoking Status: Current every day smoker


Does the pt drink ETOH?: Yes


Does the pt have substance abuse?: No





- Immunizations


Immunizations are current?: Yes





- POLST


Patient has POLST: No





PD ED PE NORMAL





- Vitals


Vital signs reviewed: Yes





- General


General: Alert and oriented X 3, No acute distress





- HEENT


HEENT: PERRL, EOMI





- Neck


Neck: Supple, no meningeal sign, No bony TTP





- Cardiac


Cardiac: RRR, No murmur





- Respiratory


Respiratory: No respiratory distress, Clear bilaterally





- Abdomen


Abdomen: Normal bowel sounds, Soft, Non tender





- Neuro


Neuro: Alert and oriented X 3, Normal speech





Results





- Vitals


Vitals: 


 Vital Signs - 24 hr











  08/10/18





  16:45


 


Temperature 36.8 C


 


Heart Rate 70


 


Respiratory 20





Rate 


 


Blood Pressure 131/100 H


 


O2 Saturation 100








 Oxygen











O2 Source                      Room air

















- Labs


Labs: 


 Laboratory Tests











  08/10/18 08/10/18





  17:01 17:01


 


WBC  7.3 


 


RBC  3.99 L 


 


Hgb  13.0 


 


Hct  37.9 


 


MCV  94.9 


 


MCH  32.5 H 


 


MCHC  34.3 


 


RDW  14.3 


 


Plt Count  233 


 


MPV  7.7 L 


 


Neut # (Auto)  5.9 


 


Lymph # (Auto)  1.1 L 


 


Mono # (Auto)  0.3 


 


Eos # (Auto)  0.0 


 


Baso # (Auto)  0.0 


 


Absolute Nucleated RBC  0.00 


 


Nucleated RBC %  0.0 


 


Sodium   140


 


Potassium   3.5


 


Chloride   107


 


Carbon Dioxide   26


 


Anion Gap   7.0


 


BUN   8


 


Creatinine   0.8


 


Estimated GFR (MDRD)   84 L


 


Glucose   102 H


 


Calcium   8.7


 


Total Bilirubin   0.8


 


AST   14


 


ALT   11


 


Alkaline Phosphatase   53


 


Total Protein   6.7


 


Albumin   3.7


 


Globulin   3.0


 


Albumin/Globulin Ratio   1.2


 


Lipase   19 L














PD MEDICAL DECISION MAKING





- ED course


ED course: 





She has chronic pelvic complaints, now that is compounded by what sounds like 

gastroenteritis with a benign exam.  On recheck at 6:25 PM after the 

administration of Toradol, Reglan, Imodium and Levsin she was feeling much 

better but still having some stomach pain but exam remained benign.  The nausea 

was much better.





- Sepsis Event


Vital Signs: 


 Vital Signs - 24 hr











  08/10/18





  16:45


 


Temperature 36.8 C


 


Heart Rate 70


 


Respiratory 20





Rate 


 


Blood Pressure 131/100 H


 


O2 Saturation 100








 Oxygen











O2 Source                      Room air

















Departure





- Departure


Disposition: 01 Home, Self Care


Clinical Impression: 


 Gastroenteritis





Condition: Good


Record reviewed to determine appropriate education?: Yes


Instructions:  ED Gastroenteritis Viral


Prescriptions: 


Loperamide [Imodium] 2 mg PO QID PRN #10 capsule


 PRN Reason: Diarrhea


Ondansetron HCl [Zofran] 4 mg PO Q6H PRN #10 tablet


 PRN Reason: Nausea / Vomiting


Promethazine [Phenergan] 25 - 50 mg PO Q6H PRN #15 tab


 PRN Reason: Nausea / Vomiting


Comments: 


If not better in 12 hours please return for reevaluation, sooner if worse.





Your blood pressure was elevated today on check into the emergency department.  

This does not mean that you have hypertension, it is a common phenomenon to 

come to the emergency department and have elevated blood pressure.  I recommend 

that you see your primary care physician within the week to have it rechecked 

when you are feeling better.

## 2018-08-14 NOTE — ED PHYSICIAN DOCUMENTATION
History of Present Illness





- Stated complaint


Stated Complaint: FEELING FAINT





- Chief complaint


Chief Complaint: General





- History obtained from


History obtained from: Patient





- History of Present Illness


Timing: Today (30-year-old with chronic pelvic pain, seen by me about 4 days 

ago for what sounds like a viral syndrome with vomiting.  That has resolved and 

she is back to her routine pelvic pain, bleeding which is not heavy, and 

nausea.  Today though she feels quite lightheaded on standing up and her vision 

whites out.  She has not syncopized.  She denies chest pain or trouble 

breathing.)





Review of Systems


Constitutional: reports: Fatigue.  denies: Fever, Chills


Cardiac: denies: Chest pain / pressure, Palpitations


Respiratory: denies: Dyspnea, Cough





PD PAST MEDICAL HISTORY





- Past Medical History


Cardiovascular: None


Respiratory: None


Endocrine/Autoimmune: None


GI: None


GYN: Ovarian cysts, Ovarian cancer


: None


HEENT: Chronic hearing loss


Psych: Depression, Anxiety


Musculoskeletal: Other


Derm: None





- Past Surgical History


Past Surgical History: Yes


General: Appendectomy


/GYN:  section, Oophrectomy





- Present Medications


Home Medications: 


 Ambulatory Orders











 Medication  Instructions  Recorded  Confirmed


 


Citalopram Hydrobromide  18





[Citalopram HBr]   


 


Cyclobenzaprine [Flexeril] 10 mg PO TID PRN #20 tablet 18 


 


Oxycodone HCl/Acetaminophen 1 - 2 tab PO Q4H PRN #15 tablet 18 





[Percocet 5-325 mg Tablet]   


 


Loperamide [Imodium] 2 mg PO QID PRN #10 capsule 08/10/18 


 


Ondansetron HCl [Zofran] 4 mg PO Q6H PRN #10 tablet 08/10/18 


 


Promethazine [Phenergan] 25 - 50 mg PO Q6H PRN #15 tab 08/10/18 














- Allergies


Allergies/Adverse Reactions: 


 Allergies











Allergy/AdvReac Type Severity Reaction Status Date / Time


 


adhesive Allergy  Rash Verified 18 22:56


 


Penicillins Allergy  Hives Verified 18 22:56


 


vancomycin Allergy  Rash Verified 18 22:56


 


latex AdvReac  Rash Verified 18 13:12














- Social History


Does the pt smoke?: Yes


Smoking Status: Current every day smoker


Does the pt drink ETOH?: Yes


Does the pt have substance abuse?: No





- Immunizations


Immunizations are current?: Yes





- POLST


Patient has POLST: No





PD ED PE NORMAL





- Vitals


Vital signs reviewed: Yes





- General


General: Alert and oriented X 3, No acute distress





- HEENT


HEENT: PERRL, EOMI





- Neck


Neck: Supple, no meningeal sign, No bony TTP





- Cardiac


Cardiac: RRR, No murmur





- Respiratory


Respiratory: No respiratory distress, Clear bilaterally





- Abdomen


Abdomen: Non tender





- Extremities


Extremities: No edema, No calf tenderness / cord





- Neuro


Neuro: Alert and oriented X 3, Normal speech





Results





- Vitals


Vitals: 


 Vital Signs - 24 hr











  18





  18:09


 


Temperature 36.7 C


 


Heart Rate 103 H


 


Respiratory 20





Rate 


 


Blood Pressure 119/87 H


 


O2 Saturation 97








 Oxygen











O2 Source                      Room air

















- Labs


Labs: 


 Laboratory Tests











  18





  18:20 18:20 19:09


 


WBC  6.5  


 


RBC  4.31  


 


Hgb  13.7  


 


Hct  40.6  


 


MCV  94.1  


 


MCH  31.7 H  


 


MCHC  33.7  


 


RDW  14.5  


 


Plt Count  287  


 


MPV  7.7 L  


 


Neut # (Auto)  4.2  


 


Lymph # (Auto)  1.8  


 


Mono # (Auto)  0.5  


 


Eos # (Auto)  0.0  


 


Baso # (Auto)  0.0  


 


Absolute Nucleated RBC  0.00  


 


Nucleated RBC %  0.0  


 


Sodium   139 


 


Potassium   3.8 


 


Chloride   103 


 


Carbon Dioxide   27 


 


Anion Gap   9.0 


 


BUN   15 


 


Creatinine   0.9 


 


Estimated GFR (MDRD)   74 L 


 


Glucose   127 H 


 


Calcium   9.6 


 


Total Bilirubin   0.2 


 


AST   16 


 


ALT   11 


 


Alkaline Phosphatase   64 


 


Total Protein   7.6 


 


Albumin   4.5 


 


Globulin   3.1 


 


Albumin/Globulin Ratio   1.5 


 


Lipase   35 


 


Urine Color    YELLOW


 


Urine Clarity    CLEAR


 


Urine pH    5.5


 


Ur Specific Gravity    1.020


 


Urine Protein    NEGATIVE


 


Urine Glucose (UA)    NEGATIVE


 


Urine Ketones    NEGATIVE


 


Urine Occult Blood    NEGATIVE


 


Urine Nitrite    NEGATIVE


 


Urine Bilirubin    NEGATIVE


 


Urine Urobilinogen    0.2 (NORMAL)


 


Ur Leukocyte Esterase    NEGATIVE


 


Ur Microscopic Review    NOT INDICATED


 


Urine Culture Comments    NOT INDICATED


 


Urine HCG, Qual    NEGATIVE














PD MEDICAL DECISION MAKING





- ED course


ED course: 





30-year-old with postural lightheadedness, probably residual dehydration from 

recently viral illness that is otherwise resolved.  Feeling better after 2 L of 

Crystalloid.  Labs are unremarkable.





- Sepsis Event


Vital Signs: 


 Vital Signs - 24 hr











  18





  18:09


 


Temperature 36.7 C


 


Heart Rate 103 H


 


Respiratory 20





Rate 


 


Blood Pressure 119/87 H


 


O2 Saturation 97








 Oxygen











O2 Source                      Room air

















Departure





- Departure


Disposition: 01 Home, Self Care


Clinical Impression: 


 Dizziness, Dehydration





Condition: Good


Record reviewed to determine appropriate education?: Yes


Instructions:  ED Near Syncope Unkn, ED Dehydration


Comments: 


Call your doctor to arrange a follow-up appointment, make the next available 

appointment.  In the interim, return anytime if worse or if new symptoms 

develop.

## 2018-08-30 NOTE — ED PHYSICIAN DOCUMENTATION
PD HPI ABD PAIN





- Stated complaint


Stated Complaint: FEMALE 





- Chief complaint


Chief Complaint: Abd Pain





- History obtained from


History obtained from: Patient





- History of Present Illness


Timing - onset: Today


Timing - details: Abrupt onset


Quality: Aching, Sharp


Location: [facility]


Associated symptoms: Vaginal bleeding.  No: Fever


Similar symptoms before: Work up / diagnostics, Treatment


Recently seen: Not recently seen





- Additional information


Additional information: 





Patient is a 30 year old female with a history of abdominal mass that is 

possibly cancerous who is presenting to the emergency department for abdominal 

pain and vaginal bleeding.  Patient states that she felt something "pop" in her 

abdomen and had vaginal bleeding that has now stopped.  





Review of Systems


Unable to obtain: Uncooperative





PD PAST MEDICAL HISTORY





- Past Medical History


Past Medical History: Yes


Cardiovascular: None


Respiratory: None


Endocrine/Autoimmune: None


GI: None


GYN: Ovarian cysts, Ovarian cancer


: None


HEENT: Chronic hearing loss


Psych: Depression, Anxiety


Musculoskeletal: Other


Derm: None





- Past Surgical History


Past Surgical History: Yes


General: Appendectomy


/GYN:  section, Oophrectomy





- Present Medications


Home Medications: 


 Ambulatory Orders











 Medication  Instructions  Recorded  Confirmed


 


Citalopram Hydrobromide  18





[Citalopram HBr]   


 


Cyclobenzaprine [Flexeril] 10 mg PO TID PRN #20 tablet 18 


 


Loperamide [Imodium] 2 mg PO QID PRN #10 capsule 08/10/18 


 


Ondansetron HCl [Zofran] 4 mg PO Q6H PRN #10 tablet 08/10/18 


 


Promethazine [Phenergan] 25 - 50 mg PO Q6H PRN #15 tab 08/10/18 














- Allergies


Allergies/Adverse Reactions: 


 Allergies











Allergy/AdvReac Type Severity Reaction Status Date / Time


 


adhesive Allergy  Rash Verified 18 22:56


 


Penicillins Allergy  Hives Verified 18 22:56


 


vancomycin Allergy  Rash Verified 18 22:56


 


latex AdvReac  Rash Verified 18 21:10














- Social History


Does the pt smoke?: Yes


Smoking Status: Current every day smoker


Does the pt drink ETOH?: Yes


Does the pt have substance abuse?: No





- Immunizations


Immunizations are current?: Yes





- POLST


Patient has POLST: No





PD ED PE NORMAL





- Vitals


Vital signs reviewed: Yes





- General


General: Alert and oriented X 3





- HEENT


HEENT: Atraumatic





- Cardiac


Cardiac: RRR





- Respiratory


Respiratory: No respiratory distress





- Derm


Derm: Normal color





- Extremities


Extremities: No deformity





- Neuro


Neuro: Normal speech


Eye Opening: Spontaneous





Results





- Vitals


Vitals: 


 Vital Signs - 24 hr











  18





  21:07 23:10


 


Temperature 36 C L 36.8 C


 


Heart Rate 116 H 76


 


Respiratory 20 16





Rate  


 


Blood Pressure 116/72 119/76


 


O2 Saturation 100 100








 Oxygen











O2 Source                      Room air

















- Labs


Labs: 


 Laboratory Tests











  18





  21:24 21:24 22:07


 


WBC  6.4  


 


RBC  4.36  


 


Hgb  14.1  


 


Hct  40.9  


 


MCV  93.9  


 


MCH  32.3 H  


 


MCHC  34.3  


 


RDW  14.2  


 


Plt Count  262  


 


MPV  7.3 L  


 


Neut # (Auto)  4.1  


 


Lymph # (Auto)  1.8  


 


Mono # (Auto)  0.5  


 


Eos # (Auto)  0.0  


 


Baso # (Auto)  0.0  


 


Absolute Nucleated RBC  0.00  


 


Nucleated RBC %  0.0  


 


Sodium   141 


 


Potassium   3.7 


 


Chloride   103 


 


Carbon Dioxide   29 


 


Anion Gap   9.0 


 


BUN   10 


 


Creatinine   0.8 


 


Estimated GFR (MDRD)   84 L 


 


Glucose   63 L 


 


Calcium   9.4 


 


Total Bilirubin   0.5 


 


AST   16 


 


ALT   12 


 


Alkaline Phosphatase   61 


 


Total Protein   7.6 


 


Albumin   4.5 


 


Globulin   3.1 


 


Albumin/Globulin Ratio   1.5 


 


Lipase   35 


 


Urine Color    YELLOW


 


Urine Clarity    CLEAR


 


Urine pH    6.0


 


Ur Specific Gravity    1.025


 


Urine Protein    NEGATIVE


 


Urine Glucose (UA)    NEGATIVE


 


Urine Ketones    NEGATIVE


 


Urine Occult Blood    NEGATIVE


 


Urine Nitrite    NEGATIVE


 


Urine Bilirubin    NEGATIVE


 


Urine Urobilinogen    0.2 (NORMAL)


 


Ur Leukocyte Esterase    NEGATIVE


 


Ur Microscopic Review    NOT INDICATED


 


Urine Culture Comments    NOT INDICATED


 


Urine HCG, Qual    NEGATIVE














PD MEDICAL DECISION MAKING





- ED course


Complexity details: reviewed old records, considered differential, d/w patient


ED course: 





Patient was seen at bedside.  labs and imaging were ordered.  patient walked 

out of the er before diagnostics could be completed.  





- Sepsis Event


Vital Signs: 


 Vital Signs - 24 hr











  18





  21:07 23:10


 


Temperature 36 C L 36.8 C


 


Heart Rate 116 H 76


 


Respiratory 20 16





Rate  


 


Blood Pressure 116/72 119/76


 


O2 Saturation 100 100








 Oxygen











O2 Source                      Room air

















Departure





- Departure


Disposition:  Against Medical Advice


Clinical Impression: 


 Abdominal pain





Condition: Stable


Discharge Date/Time: 18 23:50